# Patient Record
Sex: FEMALE | Race: WHITE | NOT HISPANIC OR LATINO | Employment: UNEMPLOYED | URBAN - METROPOLITAN AREA
[De-identification: names, ages, dates, MRNs, and addresses within clinical notes are randomized per-mention and may not be internally consistent; named-entity substitution may affect disease eponyms.]

---

## 2020-10-15 ENCOUNTER — TELEPHONE (OUTPATIENT)
Dept: OPHTHALMOLOGY | Facility: CLINIC | Age: 30
End: 2020-10-15

## 2020-10-15 NOTE — TELEPHONE ENCOUNTER
Spoke to pt at 1410  Will document in other encounter  Moe Nevarez RN 2:19 PM 10/15/20        [facility]    Phone Message    May a detailed message be left on voicemail: yes     Reason for Call: Other: Pt called requesting to speak with Moe regarding a call he left for her. Per the previous message in the chart the appt is for Bell's Palsy and would like to schedule with an Opthomologist. Please call the Pt back at [phone]. Thank you!     Action Taken: Message routed to:  [facility] EYE    Travel Screening: Not Applicable

## 2020-10-15 NOTE — TELEPHONE ENCOUNTER
Reviewed with ophthalmology residents    Scheduled next week Wednesday with Dr. Stinson at [facility] location    Pt aware of date/time/location    Pt aware to use preservative free artificial tear or gel during day and lubricating ointment at night and taping lid shut at night    Pt aware to call for any new symptoms/vision changes/concerns.  Also reviewed after hour protocols.    Pt seemed comfortable with plan    Moe Nevarez RN 3:36 PM 10/15/20    ----        Spoke to pt at 1410  Pt seen in emergency department today for new right (?) facial droopiness and incomplete closure of lid today.  Pt woke up with new symptoms today    Stroke work up performed and negative per pt-- Bell's palsy diagnosed     Reviewed using preservative free artificial tears frequently during day and lubricating eye ointment at night and taping eye shut if not closing all the way at night.  Pt also had the recommendation thru ED and was prescribed goniosol for use.    Will review with eye doctors plan for ophthalmology visit and call back    No eye pain  No vision changes  New tearing today    Moe Nevarez RN 2:22 PM 10/15/20    ----    Radha Best [phone]    Left message with direct number at [phone]  Moe Nevarez RN 1:21 PM 10/15/20        [facility]    Phone Message    May a detailed message be left on voicemail: yes     Reason for Call: Other: Pt was diagnosed with Bell's Palsy this morning and told to schedule with an opthomologist.  Please call to assist her with placement and scheduling.     Action Taken: Message routed to:  [facility] eye clinic    Travel Screening: Not Applicable

## 2020-10-20 ENCOUNTER — TELEPHONE (OUTPATIENT)
Dept: OPHTHALMOLOGY | Facility: CLINIC | Age: 30
End: 2020-10-20

## 2020-10-20 NOTE — TELEPHONE ENCOUNTER
Pt in [place] and concerned about driving in snow/winter conditions.    Pt states using frequent drops and taping lid at night shut-- eye still not closing on own    Scheduled exam Monday sept 26th in afternoon    Pt aware of date/time/location and aware to call for any new symptoms/vision changes    Moe Nevarez RN 3:28 PM 10/20/20          [facility]    Phone Message    May a detailed message be left on voicemail: yes     Reason for Call: Other: Pt was scheduled for an Appt with Dr. Stinson on 10/21/20 for Dx of Bell's Palsy. Pt stated she needs to cancel and reschedule appt. I was not sure if I could reschedule as I dont see that Dx in my protocols at all. I was advised to send a message as well since I couldnt get a hold of the back line. Please call the Pt back to reschedule. Thank you!     Action Taken: Message routed to:  [facility] EYE    Travel Screening: Not Applicable

## 2020-10-26 ENCOUNTER — OFFICE VISIT (OUTPATIENT)
Dept: OPHTHALMOLOGY | Facility: CLINIC | Age: 30
End: 2020-10-26
Attending: OPHTHALMOLOGY
Payer: COMMERCIAL

## 2020-10-26 DIAGNOSIS — G51.0 BELL'S PALSY: Primary | ICD-10-CM

## 2020-10-26 PROCEDURE — G0463 HOSPITAL OUTPT CLINIC VISIT: HCPCS

## 2020-10-26 PROCEDURE — 99203 OFFICE O/P NEW LOW 30 MIN: CPT | Mod: GC | Performed by: OPHTHALMOLOGY

## 2020-10-26 RX ORDER — CETIRIZINE HYDROCHLORIDE 10 MG/1
10 TABLET ORAL DAILY PRN
COMMUNITY

## 2020-10-26 SDOH — HEALTH STABILITY: MENTAL HEALTH: HOW OFTEN DO YOU HAVE A DRINK CONTAINING ALCOHOL?: NEVER

## 2020-10-26 ASSESSMENT — TONOMETRY
IOP_METHOD: TONOPEN
OD_IOP_MMHG: 14
OS_IOP_MMHG: 15

## 2020-10-26 ASSESSMENT — CONF VISUAL FIELD
OD_NORMAL: 1
OS_NORMAL: 1
METHOD: COUNTING FINGERS

## 2020-10-26 ASSESSMENT — SLIT LAMP EXAM - LIDS
COMMENTS: NORMAL
COMMENTS: NORMAL

## 2020-10-26 ASSESSMENT — REFRACTION_WEARINGRX
OS_CYLINDER: +3.00
OD_CYLINDER: +3.00
OD_SPHERE: -11.50
SPECS_TYPE: SVL
OD_AXIS: 101
OS_AXIS: 95
OS_SPHERE: -11.00

## 2020-10-26 ASSESSMENT — CUP TO DISC RATIO
OS_RATIO: 0.3
OD_RATIO: 0.3

## 2020-10-26 ASSESSMENT — VISUAL ACUITY
METHOD: SNELLEN - LINEAR
OS_CC: 20/25
OD_CC+: -1
OD_CC: 20/25

## 2020-10-26 NOTE — PROGRESS NOTES
Chief Complaint/Presenting Concern: Bell's palsy     History of Present Illness:     Patient is here to follow up on left Bell's palsy. Onset was 4 days ago and she went to the ED immediately. Currently the left eye feels a little dry/burning. No improvement since onset. Has been doing celluvisc during the day and at bedtime. Tapes the eye shut at night. Last eye exam was last year (undilated). She is on Valtrex and prednisone as prescribed by the ED doctor.   Before the onset of Bell's palsy, she felt that the lymph nodes on the left neck was swollen. There was some soreness in the left ear.     Additional Ocular History:   Strabismus surgery in childhood  CL wearer     Relevant Past Medical/Family/Social History:  No family history of eye issues     Relevant Review of Systems: as per HPI    Current eye related medications:  Celluvisc frequently     Assessment and plan:    LEFT Bell's palsy  - Unknown etiology. Probably viral. Patient is on Valtrex and prednisone as instructed by ED  - Continue frequent celluvisc during the day  - Use artificial tear ointment at bedtime and tape the left eye shut. May try Tranquileyes to reduce irritation from the tape  - Discussed with patient about return precautions including vision decrease, increased pain, white spot on the cornea    RTC in 1 month, [place]      Seen and discussed with Dr. Vines.     Cindy Dowd MD  Ophthalmology PGY-3    Teaching statement:  Complete documentation of historical and exam elements from today's encounter can be found in the full encounter summary report (not reduplicated in this progress note). I personally obtained the chief complaint(s) and history of present illness.  I confirmed and edited as necessary the review of systems, past medical/surgical history, family history, social history, and examination findings as documented by others; and I examined the patient myself. I personally reviewed the relevant tests, images, and reports as documented  above.     I formulated and edited as necessary the assessment and plan and discussed the findings and management plan with the patient and family.    Mireille Vines MD  Comprehensive Ophthalmology & Ocular Pathology  Department of Ophthalmology and Visual Neurosciences  [email or web address]  Pager [phone]

## 2020-10-26 NOTE — NURSING NOTE
"Chief Complaints and History of Present Illnesses   Patient presents with     Consult     Chief Complaint(s) and History of Present Illness(es)     Consult               Comments     Radha is here after being Seen by ED. She says last Thursday she woke with her face feeling numb. Later she noticed her L side of her face felt \"droopy\" then her LE would not close. She says today her face feels less numb, but still can't close LE. She has been using Refresh plus through out the day and taping her LE closed at night.   She denies pain    Charlie Ledesma COT 1:08 PM October 26, 2020                   "

## 2020-11-18 ENCOUNTER — OFFICE VISIT (OUTPATIENT)
Dept: OPHTHALMOLOGY | Facility: CLINIC | Age: 30
End: 2020-11-18
Attending: OPHTHALMOLOGY
Payer: COMMERCIAL

## 2020-11-18 DIAGNOSIS — G51.0 BELL'S PALSY: Primary | ICD-10-CM

## 2020-11-18 PROCEDURE — G0463 HOSPITAL OUTPT CLINIC VISIT: HCPCS

## 2020-11-18 PROCEDURE — 99213 OFFICE O/P EST LOW 20 MIN: CPT | Mod: GC | Performed by: OPHTHALMOLOGY

## 2020-11-18 ASSESSMENT — VISUAL ACUITY
CORRECTION_TYPE: GLASSES
METHOD: SNELLEN - LINEAR
OS_CC: 20/20-2
OD_CC: 20/20-3

## 2020-11-18 ASSESSMENT — REFRACTION_WEARINGRX
OD_CYLINDER: +3.00
OS_CYLINDER: +3.00
OD_SPHERE: -11.50
OD_AXIS: 101
SPECS_TYPE: SVL
OS_SPHERE: -11.00
OS_AXIS: 95

## 2020-11-18 ASSESSMENT — TONOMETRY
OD_IOP_MMHG: 20
IOP_METHOD: TONOPEN
OS_IOP_MMHG: 19

## 2020-11-18 ASSESSMENT — SLIT LAMP EXAM - LIDS
COMMENTS: NORMAL
COMMENTS: NORMAL

## 2020-11-18 ASSESSMENT — CUP TO DISC RATIO
OS_RATIO: 0.3
OD_RATIO: 0.3

## 2020-11-18 ASSESSMENT — CONF VISUAL FIELD
METHOD: COUNTING FINGERS
OS_NORMAL: 1
OD_NORMAL: 1

## 2020-11-18 NOTE — PROGRESS NOTES
Chief Complaint/Presenting Concern: Bell's palsy     History of Present Illness: 10/26/2020    Patient is here to follow up on left Bell's palsy. Onset was 4 days ago and she went to the ED immediately. Currently the left eye feels a little dry/burning. No improvement since onset. Has been doing celluvisc during the day and at bedtime. Tapes the eye shut at night. Last eye exam was last year (undilated). She is on Valtrex and prednisone as prescribed by the ED doctor.   Before the onset of Bell's palsy, she felt that the lymph nodes on the left neck was swollen. There was some soreness in the left ear.     Interim history: 11/18/2020  Patient feels that her left eye has improved. She can blink a lot more, but the muscles still feel a little weaker.  No change of vision. She is using celluvisc a couple times a day and before bedtime. She is not using ointment because she cannot find the right one. She is still taping her left eye shut at night. Not wearing CL at this time.     Additional Ocular History:   Strabismus surgery in childhood  CL wearer     Relevant Past Medical/Family/Social History:  No family history of eye issues     Relevant Review of Systems: as per HPI    Current eye related medications:  Celluvisc frequently     Assessment and plan:    LEFT Bell's palsy  - Unknown etiology. Probably viral. Completed prednisone and valtrex  - Dryness on cornea  - Orbicularis strength has improved, able to blink completely  - Continue frequent celluvisc during the day   - Continue to tape the left eye at night and use ointment if available  - Discussed with patient about return precautions including vision decrease, increased pain, white spot on the cornea    RTC in 2 months or sooner PRN, [place]      Seen and discussed with Dr. Vines.     Cindy Dowd MD  Ophthalmology PGY-3    Teaching statement:  Complete documentation of historical and exam elements from today's encounter can be found in the full encounter summary  report (not reduplicated in this progress note). I personally obtained the chief complaint(s) and history of present illness.  I confirmed and edited as necessary the review of systems, past medical/surgical history, family history, social history, and examination findings as documented by others; and I examined the patient myself. I personally reviewed the relevant tests, images, and reports as documented above.     I formulated and edited as necessary the assessment and plan and discussed the findings and management plan with the patient and family.    Mireille Vines MD  Comprehensive Ophthalmology & Ocular Pathology  Department of Ophthalmology and Visual Neurosciences  [email or web address]  Pager [phone]

## 2021-01-09 ENCOUNTER — HEALTH MAINTENANCE LETTER (OUTPATIENT)
Age: 31
End: 2021-01-09

## 2021-01-20 ENCOUNTER — OFFICE VISIT (OUTPATIENT)
Dept: OPHTHALMOLOGY | Facility: CLINIC | Age: 31
End: 2021-01-20
Attending: OPHTHALMOLOGY
Payer: COMMERCIAL

## 2021-01-20 DIAGNOSIS — G51.0 BELL'S PALSY: Primary | ICD-10-CM

## 2021-01-20 PROCEDURE — G0463 HOSPITAL OUTPT CLINIC VISIT: HCPCS

## 2021-01-20 PROCEDURE — 99213 OFFICE O/P EST LOW 20 MIN: CPT | Mod: GC | Performed by: OPHTHALMOLOGY

## 2021-01-20 ASSESSMENT — TONOMETRY
IOP_METHOD: ICARE
OS_IOP_MMHG: 17
OD_IOP_MMHG: 17

## 2021-01-20 ASSESSMENT — VISUAL ACUITY
CORRECTION_TYPE: GLASSES
OS_CC+: -2
OD_CC: 20/30
OD_PH_CC: 20/25
OS_CC: 20/20
METHOD: SNELLEN - LINEAR

## 2021-01-20 ASSESSMENT — REFRACTION_WEARINGRX
OS_SPHERE: -11.00
OD_CYLINDER: +3.00
SPECS_TYPE: SVL
OD_SPHERE: -11.50
OS_AXIS: 95
OS_CYLINDER: +3.00
OD_AXIS: 101

## 2021-01-20 ASSESSMENT — CONF VISUAL FIELD
METHOD: COUNTING FINGERS
OS_NORMAL: 1
OD_NORMAL: 1

## 2021-01-20 ASSESSMENT — EXTERNAL EXAM - RIGHT EYE: OD_EXAM: NORMAL

## 2021-01-20 ASSESSMENT — SLIT LAMP EXAM - LIDS
COMMENTS: NORMAL
COMMENTS: NORMAL

## 2021-01-20 ASSESSMENT — EXTERNAL EXAM - LEFT EYE: OS_EXAM: NORMAL

## 2021-01-20 ASSESSMENT — CUP TO DISC RATIO
OD_RATIO: 0.3
OS_RATIO: 0.3

## 2021-01-20 NOTE — NURSING NOTE
Chief Complaints and History of Present Illnesses   Patient presents with     Follow Up     2 month follow up LEFT Bell's palsy     Chief Complaint(s) and History of Present Illness(es)     Follow Up     Comments: 2 month follow up LEFT Bell's palsy              Comments     Pt states vision has been good since last visit.   Pt notes some random twitching in LE that last 15-30 seconds. Last episode was a few weeks ago.  No irritation. Pt still tapes her LE closed at night. Pt using less AT's during the day for dryness.    TATUM Bolanos January 20, 2021 7:14 AM

## 2021-01-20 NOTE — PROGRESS NOTES
Chief Complaint/Presenting Concern: Bell's palsy     History of Present Illness: 10/26/2020    Patient is here to follow up on left Bell's palsy. Onset was 4 days ago and she went to the ED immediately. Currently the left eye feels a little dry/burning. No improvement since onset. Has been doing celluvisc during the day and at bedtime. Tapes the eye shut at night. Last eye exam was last year (undilated). She is on Valtrex and prednisone as prescribed by the ED doctor.   Before the onset of Bell's palsy, she felt that the lymph nodes on the left neck was swollen. There was some soreness in the left ear.     Interim history: 1/20/2021  Patient feels good. There is no dryness of the left eye. Vision is good. She still tapes her left eye at night at bedtime. There was a few episodes of left upper eyelid spasms lasting a few seconds, but it seems to be getting better.     Additional Ocular History:   Strabismus surgery in childhood  CL wearer     Relevant Past Medical/Family/Social History:  No family history of eye issues     Relevant Review of Systems: as per HPI    Current eye related medications:  Celluvisc frequently     Assessment and plan:    LEFT Bell's palsy  - Unknown etiology. Probably viral. Completed prednisone and valtrex  - Complete blink, no lagophthalmos  - Clear cornea, symmetric bilaterally  - Discussed with patient about return precautions including vision decrease, increased pain, white spot on the cornea  - May start to try not taping the eyelids at night  - Patient will follow up with her regular optometrist for contact lens and get dilated exam    RTC PRN      Seen and discussed with Dr. Vines.     Cindy Dowd MD  Ophthalmology PGY-3    Teaching statement:  Complete documentation of historical and exam elements from today's encounter can be found in the full encounter summary report (not reduplicated in this progress note). I personally obtained the chief complaint(s) and history of present  illness.  I confirmed and edited as necessary the review of systems, past medical/surgical history, family history, social history, and examination findings as documented by others; and I examined the patient myself. I personally reviewed the relevant tests, images, and reports as documented above.     I formulated and edited as necessary the assessment and plan and discussed the findings and management plan with the patient and family.    Mireille Vines MD  Comprehensive Ophthalmology & Ocular Pathology  Department of Ophthalmology and Visual Neurosciences  [email or web address]  Pager [phone]

## 2021-10-11 ENCOUNTER — HEALTH MAINTENANCE LETTER (OUTPATIENT)
Age: 31
End: 2021-10-11

## 2022-01-30 ENCOUNTER — HEALTH MAINTENANCE LETTER (OUTPATIENT)
Age: 32
End: 2022-01-30

## 2022-09-24 ENCOUNTER — HEALTH MAINTENANCE LETTER (OUTPATIENT)
Age: 32
End: 2022-09-24

## 2023-05-08 ENCOUNTER — HEALTH MAINTENANCE LETTER (OUTPATIENT)
Age: 33
End: 2023-05-08